# Patient Record
Sex: FEMALE | Race: OTHER | Employment: UNEMPLOYED | URBAN - METROPOLITAN AREA
[De-identification: names, ages, dates, MRNs, and addresses within clinical notes are randomized per-mention and may not be internally consistent; named-entity substitution may affect disease eponyms.]

---

## 2019-04-05 ENCOUNTER — OFFICE VISIT (OUTPATIENT)
Dept: FAMILY MEDICINE CLINIC | Facility: CLINIC | Age: 44
End: 2019-04-05
Payer: COMMERCIAL

## 2019-04-05 VITALS
TEMPERATURE: 97 F | HEIGHT: 60.25 IN | HEART RATE: 76 BPM | WEIGHT: 141.38 LBS | OXYGEN SATURATION: 98 % | DIASTOLIC BLOOD PRESSURE: 78 MMHG | SYSTOLIC BLOOD PRESSURE: 108 MMHG | BODY MASS INDEX: 27.4 KG/M2 | RESPIRATION RATE: 18 BRPM

## 2019-04-05 DIAGNOSIS — Z00.00 LABORATORY EXAM ORDERED AS PART OF ROUTINE GENERAL MEDICAL EXAMINATION: ICD-10-CM

## 2019-04-05 DIAGNOSIS — Z78.9 VEGETARIAN DIET: ICD-10-CM

## 2019-04-05 DIAGNOSIS — Z01.419 WELL WOMAN EXAM WITH ROUTINE GYNECOLOGICAL EXAM: Primary | ICD-10-CM

## 2019-04-05 DIAGNOSIS — Z12.31 VISIT FOR SCREENING MAMMOGRAM: ICD-10-CM

## 2019-04-05 DIAGNOSIS — I10 ESSENTIAL HYPERTENSION: ICD-10-CM

## 2019-04-05 DIAGNOSIS — E55.9 VITAMIN D DEFICIENCY: ICD-10-CM

## 2019-04-05 PROCEDURE — 87624 HPV HI-RISK TYP POOLED RSLT: CPT | Performed by: FAMILY MEDICINE

## 2019-04-05 PROCEDURE — 88175 CYTOPATH C/V AUTO FLUID REDO: CPT | Performed by: FAMILY MEDICINE

## 2019-04-05 PROCEDURE — 99386 PREV VISIT NEW AGE 40-64: CPT | Performed by: FAMILY MEDICINE

## 2019-04-05 NOTE — PROGRESS NOTES
Patient presents with: Well Adult      HPI:  44yr old female with HTN presents for WWE. Doing well overall. No concerns today. HTN, stable. Taking Benicar daily, no SEs noted. Menses regular, LMP 3/13. Last PAP in 2014 and nl. Last mammogram in 2017. Smoker      Smokeless tobacco: Never Used    Alcohol use: Yes      Alcohol/week: 0.0 oz      Comment: Occasional     Drug use: No        Current Outpatient Medications:  Ascorbic Acid (VITAMIN C OR) Take 500 mg by mouth daily.    Disp:  Rfl:          Sulfa Immunization History  Administered            Date(s) Administered    TDAP                  05/14/2012    Deferred                Date(s) Deferred    FLU VACC 4 ABIGAIL Split Virus 3 YR+ (97856)                          11/12/2015      Osteoporosis Screening this Visit:  Requested Prescriptions      No prescriptions requested or ordered in this encounter       Imaging & Consults:  TABATHA SCREENING BILAT (CPT=77067)      No follow-ups on file.

## 2019-04-05 NOTE — PATIENT INSTRUCTIONS
Prevention Guidelines, Women Ages 36 to 52  Screening tests and vaccines are an important part of managing your health. A screening test is done to find possible disorders or diseases in people who don't have any symptoms.  The goal is to find a disease e Depression All women in this age group At routine exams   Gonorrhea Sexually active women at increased risk for infection At routine exams   Hepatitis C Anyone at increased risk; 1 time for those born between [facility] At routine exams   High cholester Meningococcal Women at increased risk for infection–talk with your healthcare provider 1 or more doses   Pneumococcal conjugate vaccine (PCV13) and pneumococcal polysaccharide vaccine (PPSV23) Women at increased risk for infection–talk with your healthcare

## 2019-04-10 ENCOUNTER — TELEPHONE (OUTPATIENT)
Dept: FAMILY MEDICINE CLINIC | Facility: CLINIC | Age: 44
End: 2019-04-10

## 2019-04-10 NOTE — TELEPHONE ENCOUNTER
----- Message from [facility], DO sent at 4/10/2019  9:10 AM CDT -----  Pls inform pt that pap nl and hpv neg.  Repeat in 3yrs

## 2019-04-18 ENCOUNTER — TELEPHONE (OUTPATIENT)
Dept: FAMILY MEDICINE CLINIC | Facility: CLINIC | Age: 44
End: 2019-04-18

## 2019-04-18 NOTE — TELEPHONE ENCOUNTER
Patient states she spoke with someone and asked for her lab orders to be faxed to [address] [place] [address], but they did not get the fax number. I will print all orders and fax to [address] [place] [address] at [phone] as patient is waiting.

## 2019-05-07 ENCOUNTER — TELEPHONE (OUTPATIENT)
Dept: FAMILY MEDICINE CLINIC | Facility: CLINIC | Age: 44
End: 2019-05-07

## 2019-05-07 NOTE — TELEPHONE ENCOUNTER
Pt called asking for blood test results.  She also scheduled a blood test f/u appointment for 5/16/19

## 2019-05-07 NOTE — TELEPHONE ENCOUNTER
Called patient who states she had her labs done at [facility] approx 2 weeks ago. No copy of labs in chart. Called [facility] and requested they fax all lab results. They states they will send labs from 4/18/19 to fax [phone].     Dr. Wendy Salazar,  [address] [place] [address]

## 2019-05-09 ENCOUNTER — MED REC SCAN ONLY (OUTPATIENT)
Dept: FAMILY MEDICINE CLINIC | Facility: CLINIC | Age: 44
End: 2019-05-09

## 2019-05-09 NOTE — TELEPHONE ENCOUNTER
Pls inform pt that labs all wnl except LDL at high end of normal at 100. Cont healthy eating habits and regular exercise. All other labs wnl.

## 2019-05-16 ENCOUNTER — OFFICE VISIT (OUTPATIENT)
Dept: FAMILY MEDICINE CLINIC | Facility: CLINIC | Age: 44
End: 2019-05-16
Payer: COMMERCIAL

## 2019-05-16 VITALS
DIASTOLIC BLOOD PRESSURE: 60 MMHG | HEIGHT: 60.25 IN | OXYGEN SATURATION: 99 % | WEIGHT: 143.13 LBS | HEART RATE: 80 BPM | TEMPERATURE: 98 F | SYSTOLIC BLOOD PRESSURE: 110 MMHG | BODY MASS INDEX: 27.73 KG/M2 | RESPIRATION RATE: 16 BRPM

## 2019-05-16 DIAGNOSIS — I10 ESSENTIAL HYPERTENSION: Primary | ICD-10-CM

## 2019-05-16 DIAGNOSIS — N64.52 BILATERAL NIPPLE DISCHARGE: ICD-10-CM

## 2019-05-16 PROCEDURE — 99214 OFFICE O/P EST MOD 30 MIN: CPT | Performed by: FAMILY MEDICINE

## 2019-05-16 RX ORDER — OLMESARTAN MEDOXOMIL 5 MG/1
10 TABLET ORAL DAILY
COMMUNITY
End: 2019-05-20

## 2019-05-16 NOTE — PATIENT INSTRUCTIONS
What Are Benign Breast Conditions? Most breast conditions are not cancer (benign), causing no serious harm to you. But all women are at some risk for breast cancer. Your risk increases as you grow older. Common benign breast changes are covered here.  H Benign breast lumps come in all shapes, textures, and sizes. A lump of fibrous tissue (fibroadenoma) may be smooth, firm, and rubbery. This type of lump is usually painless and movable. Have any lump checked by your healthcare provider.   Date Last Reviewed

## 2019-05-19 NOTE — PROGRESS NOTES
HPI:    Patient ID: Shailesh Macias is a 44year old female. HPI   43yr old female presents for f/u on HTN, recent labs and c/o bilateral nipple discharge. HTN, stable. Compliant with medication. No SEs noted.    Had recent labs done and here to f/u o affect. Her behavior is normal.   Nursing note and vitals reviewed. ASSESSMENT/PLAN:   1. Essential hypertension  - bp stable  - cpm    2.  Bilateral nipple discharge  - discussed possible etiologies of nipple discharge, appears benign  - mammog

## 2019-05-20 ENCOUNTER — TELEPHONE (OUTPATIENT)
Dept: FAMILY MEDICINE CLINIC | Facility: CLINIC | Age: 44
End: 2019-05-20

## 2019-05-20 RX ORDER — OLMESARTAN MEDOXOMIL 5 MG/1
10 TABLET ORAL DAILY
Qty: 180 TABLET | Refills: 0 | Status: SHIPPED | OUTPATIENT
Start: 2019-05-20 | End: 2019-08-18

## 2019-05-20 NOTE — TELEPHONE ENCOUNTER
Spoke to patient she says she has been on Benicar 10 mg Qd for a long time. She got the medicine from a dr in [place]. She would like a refill. Patient says she thought she would have gotten a refill as she asked for it at her appointment.

## 2019-05-20 NOTE — TELEPHONE ENCOUNTER
Dr Alondra Mccoy, you have never given the pt Benicar    No labs done yet so Rx can't be filled by nurses    Pended new Rx, please advise?

## 2019-07-17 ENCOUNTER — TELEPHONE (OUTPATIENT)
Dept: FAMILY MEDICINE CLINIC | Facility: CLINIC | Age: 44
End: 2019-07-17

## 2019-07-17 NOTE — TELEPHONE ENCOUNTER
Patient called and states has creamy vaginal discharge that is causing itching and irritation. Denies pain, UTI symptoms. Was told she could not be seen if she was on her Menses. LMP ended 6/26/19 and usually has a 28 day cycle.   Feels like her period w

## 2019-07-18 NOTE — TELEPHONE ENCOUNTER
Called patient for condition update. Has not started menses yet. Offered earlier appt. States she cannot come in any earlier due to dentist appt for daughter. Will come to scheduled appt this afternoon.     Future Appointments   Date Time Provider Depar

## 2019-09-11 ENCOUNTER — OFFICE VISIT (OUTPATIENT)
Dept: FAMILY MEDICINE CLINIC | Facility: CLINIC | Age: 44
End: 2019-09-11
Payer: COMMERCIAL

## 2019-09-11 VITALS
DIASTOLIC BLOOD PRESSURE: 74 MMHG | WEIGHT: 143 LBS | RESPIRATION RATE: 18 BRPM | BODY MASS INDEX: 27.71 KG/M2 | HEART RATE: 90 BPM | OXYGEN SATURATION: 98 % | TEMPERATURE: 98 F | SYSTOLIC BLOOD PRESSURE: 110 MMHG | HEIGHT: 60.25 IN

## 2019-09-11 DIAGNOSIS — J30.2 SEASONAL ALLERGIC RHINITIS, UNSPECIFIED TRIGGER: Primary | ICD-10-CM

## 2019-09-11 DIAGNOSIS — I10 ESSENTIAL HYPERTENSION: ICD-10-CM

## 2019-09-11 DIAGNOSIS — J02.9 SORE THROAT: ICD-10-CM

## 2019-09-11 DIAGNOSIS — M21.42 PES PLANUS OF LEFT FOOT: ICD-10-CM

## 2019-09-11 PROCEDURE — 99214 OFFICE O/P EST MOD 30 MIN: CPT | Performed by: FAMILY MEDICINE

## 2019-09-11 RX ORDER — FLUTICASONE PROPIONATE 50 MCG
1 SPRAY, SUSPENSION (ML) NASAL DAILY
Qty: 1 INHALER | Refills: 2 | Status: SHIPPED | OUTPATIENT
Start: 2019-09-11 | End: 2019-12-24

## 2019-09-11 RX ORDER — OLMESARTAN MEDOXOMIL 5 MG/1
10 TABLET ORAL DAILY
Qty: 90 TABLET | Refills: 0 | Status: CANCELLED | OUTPATIENT
Start: 2019-09-11

## 2019-09-11 NOTE — PROGRESS NOTES
HPI:   Cynthia Harden is a 38year old female who presents for sore throat, L foot pain and HTN. C/o sore and itchy throat for the past 2wks. Has tried home remedies without any relief.  Daughter sick with URI symptoms and she started having congestion of L foot, no pain with palpation    ASSESSMENT AND PLAN:   Nery Heath is a 38year old female who presents with:  1. Seasonal allergic rhinitis, unspecified trigger  2.  Sore throat  - clinically consistent with allergic rhinitis  - advised taking a

## 2019-09-30 ENCOUNTER — IMMUNIZATION (OUTPATIENT)
Dept: FAMILY MEDICINE CLINIC | Facility: CLINIC | Age: 44
End: 2019-09-30
Payer: COMMERCIAL

## 2019-09-30 DIAGNOSIS — Z23 NEED FOR VACCINATION: ICD-10-CM

## 2019-09-30 PROCEDURE — 90686 IIV4 VACC NO PRSV 0.5 ML IM: CPT | Performed by: FAMILY MEDICINE

## 2019-09-30 PROCEDURE — 90471 IMMUNIZATION ADMIN: CPT | Performed by: FAMILY MEDICINE

## 2019-10-04 ENCOUNTER — OFFICE VISIT (OUTPATIENT)
Dept: FAMILY MEDICINE CLINIC | Facility: CLINIC | Age: 44
End: 2019-10-04
Payer: COMMERCIAL

## 2019-10-04 VITALS — SYSTOLIC BLOOD PRESSURE: 116 MMHG | DIASTOLIC BLOOD PRESSURE: 78 MMHG

## 2019-10-04 DIAGNOSIS — Z01.30 BLOOD PRESSURE CHECK: Primary | ICD-10-CM

## 2019-10-31 ENCOUNTER — TELEPHONE (OUTPATIENT)
Dept: FAMILY MEDICINE CLINIC | Facility: CLINIC | Age: 44
End: 2019-10-31

## 2019-10-31 NOTE — TELEPHONE ENCOUNTER
Spoke to patient who states she accidentally touched a hot lid when she was cooking 3-4 days ago. Has some redness on a couple fingers. Denies blistering, drainage, or open skin. Has been putting neosporin ointment on red areas. Denies any pain or fever.

## 2019-10-31 NOTE — TELEPHONE ENCOUNTER
Evaluation of burn or \"skin tags,\" would require an ov not a physical. Pt not due for physical given previous 6mo ago

## 2019-10-31 NOTE — TELEPHONE ENCOUNTER
Pt called asking to schedule another physical for the year. She had a physical 4/2019. She told me she got a new ins in September 2019 therefore wants another physical tomorrow. Informed her Dr's not in the office.   She stated she wants a physical so s

## 2019-11-08 ENCOUNTER — OFFICE VISIT (OUTPATIENT)
Dept: FAMILY MEDICINE CLINIC | Facility: CLINIC | Age: 44
End: 2019-11-08
Payer: COMMERCIAL

## 2019-11-08 VITALS — DIASTOLIC BLOOD PRESSURE: 82 MMHG | SYSTOLIC BLOOD PRESSURE: 124 MMHG

## 2019-11-08 DIAGNOSIS — Z01.30 BLOOD PRESSURE CHECK: Primary | ICD-10-CM

## 2019-11-19 ENCOUNTER — OFFICE VISIT (OUTPATIENT)
Dept: FAMILY MEDICINE CLINIC | Facility: CLINIC | Age: 44
End: 2019-11-19
Payer: COMMERCIAL

## 2019-11-19 VITALS — SYSTOLIC BLOOD PRESSURE: 120 MMHG | DIASTOLIC BLOOD PRESSURE: 70 MMHG

## 2019-11-19 DIAGNOSIS — Z01.30 BLOOD PRESSURE CHECK: Primary | ICD-10-CM

## 2019-12-20 ENCOUNTER — HOSPITAL ENCOUNTER (EMERGENCY)
Facility: HOSPITAL | Age: 44
Discharge: HOME | End: 2019-12-21
Attending: EMERGENCY MEDICINE
Payer: COMMERCIAL

## 2019-12-20 VITALS
HEART RATE: 77 BPM | RESPIRATION RATE: 18 BRPM | BODY MASS INDEX: 27.26 KG/M2 | HEIGHT: 60 IN | TEMPERATURE: 98 F | SYSTOLIC BLOOD PRESSURE: 119 MMHG | OXYGEN SATURATION: 100 % | WEIGHT: 138.88 LBS | DIASTOLIC BLOOD PRESSURE: 90 MMHG

## 2019-12-20 DIAGNOSIS — T78.40XA ALLERGIC REACTION, INITIAL ENCOUNTER: Primary | ICD-10-CM

## 2019-12-20 PROCEDURE — S0028 INJECTION, FAMOTIDINE, 20 MG: HCPCS

## 2019-12-20 PROCEDURE — 99283 EMERGENCY DEPT VISIT LOW MDM: CPT

## 2019-12-20 RX ORDER — FAMOTIDINE 10 MG/ML
INJECTION, SOLUTION INTRAVENOUS
Status: DISCONTINUED
Start: 2019-12-20 | End: 2019-12-20 | Stop reason: WASHOUT

## 2019-12-20 RX ORDER — METHYLPREDNISOLONE SODIUM SUCCINATE 125 MG/2ML
INJECTION, POWDER, LYOPHILIZED, FOR SOLUTION INTRAMUSCULAR; INTRAVENOUS
Status: DISCONTINUED
Start: 2019-12-20 | End: 2019-12-20 | Stop reason: WASHOUT

## 2019-12-20 RX ORDER — PREDNISONE 20 MG/1
60 TABLET ORAL ONCE
Status: COMPLETED | OUTPATIENT
Start: 2019-12-20 | End: 2019-12-20

## 2019-12-20 RX ORDER — FAMOTIDINE 20 MG/1
20 TABLET ORAL ONCE
Status: COMPLETED | OUTPATIENT
Start: 2019-12-20 | End: 2019-12-20

## 2019-12-20 RX ORDER — DIPHENHYDRAMINE HCL 25 MG
50 CAPSULE ORAL ONCE
Status: COMPLETED | OUTPATIENT
Start: 2019-12-20 | End: 2019-12-20

## 2019-12-20 RX ORDER — DIPHENHYDRAMINE HYDROCHLORIDE 50 MG/ML
INJECTION INTRAMUSCULAR; INTRAVENOUS
Status: DISCONTINUED
Start: 2019-12-20 | End: 2019-12-20 | Stop reason: WASHOUT

## 2019-12-20 RX ORDER — OLMESARTAN MEDOXOMIL 5 MG/1
5 TABLET ORAL DAILY
COMMUNITY
End: 2021-11-19

## 2019-12-20 RX ORDER — PREDNISONE 20 MG/1
40 TABLET ORAL DAILY
Qty: 10 TABLET | Refills: 0 | Status: SHIPPED | OUTPATIENT
Start: 2019-12-20 | End: 2019-12-25

## 2019-12-21 NOTE — ED INITIAL ASSESSMENT (HPI)
Pt presents to ED with allergic reaction. Pt reports she ate shrimp at 830pm, and 30 minutes later developed severe itching to bilateral hands and feet. Reports rash developed and has spread. Denies taking benadryl at home.  Denies MICAH

## 2019-12-21 NOTE — ED PROVIDER NOTES
Patient Seen in: [facility] Emergency Department      History   Patient presents with:   Allergic Rxn Allergies    Stated Complaint: allergic reaction    HPI  CHIEF COMPLAINT: Allergic reaction    HISTORY OF PRESENT ILLNESS: Patient is a 16-year-old Systems    Positive for stated complaint: allergic reaction  Other systems are as noted in HPI. Constitutional and vital signs reviewed. All other systems reviewed and negative except as noted above.     Physical Exam     ED Triage Vitals [12/20/19 22 determined, within reasonable clinical confidence and prior to discharge, that an emergency medical condition was not or was no longer present. There was no indication for further evaluation, treatment or admission on an emergency basis.   Comprehensive ve

## 2019-12-24 ENCOUNTER — OFFICE VISIT (OUTPATIENT)
Dept: FAMILY MEDICINE CLINIC | Facility: CLINIC | Age: 44
End: 2019-12-24
Payer: COMMERCIAL

## 2019-12-24 ENCOUNTER — APPOINTMENT (OUTPATIENT)
Dept: LAB | Age: 44
End: 2019-12-24
Attending: FAMILY MEDICINE
Payer: COMMERCIAL

## 2019-12-24 VITALS
SYSTOLIC BLOOD PRESSURE: 116 MMHG | DIASTOLIC BLOOD PRESSURE: 70 MMHG | WEIGHT: 143.5 LBS | HEIGHT: 60 IN | BODY MASS INDEX: 28.17 KG/M2 | TEMPERATURE: 98 F | HEART RATE: 88 BPM | OXYGEN SATURATION: 99 % | RESPIRATION RATE: 16 BRPM

## 2019-12-24 DIAGNOSIS — T78.40XA ALLERGIC REACTION, INITIAL ENCOUNTER: ICD-10-CM

## 2019-12-24 DIAGNOSIS — T78.40XA ALLERGIC REACTION, INITIAL ENCOUNTER: Primary | ICD-10-CM

## 2019-12-24 DIAGNOSIS — I10 ESSENTIAL HYPERTENSION: ICD-10-CM

## 2019-12-24 DIAGNOSIS — L50.9 URTICARIA: ICD-10-CM

## 2019-12-24 PROCEDURE — 86003 ALLG SPEC IGE CRUDE XTRC EA: CPT | Performed by: FAMILY MEDICINE

## 2019-12-24 PROCEDURE — 99214 OFFICE O/P EST MOD 30 MIN: CPT | Performed by: FAMILY MEDICINE

## 2019-12-24 RX ORDER — EPINEPHRINE 0.3 MG/.3ML
0.3 INJECTION SUBCUTANEOUS ONCE
Qty: 1 EACH | Refills: 0 | Status: SHIPPED | OUTPATIENT
Start: 2019-12-24 | End: 2019-12-24

## 2019-12-24 NOTE — PATIENT INSTRUCTIONS
First Aid: Allergic Reactions  Limited reaction  A limited (localized) reaction affects only the area of contact. Some reactions may not show up for days. Others can occur almost right away. Step 1.  Stop the source  · If the person has been stung, scrap shock or provide rescue breathing or CPR, if needed. When to seek medical help  An allergic reaction may become more serious over time. Seek medical help if any of the following is true:  · A rash or hives covers the face, genitals, or most of the body. Talk with your healthcare provider right away if you think a medicine gave you hives. Watch for anaphylaxis  If you have hives, watch for symptoms of a severe reaction that can affect your entire body.  This is called anaphylaxis. Symptoms can include swol

## 2019-12-24 NOTE — PROGRESS NOTES
HPI:    Patient ID: Kellie Delcid is a 38year old female. HPI  41yr old female presents for f/u after recent ER visit for an allergic reaction on 12/20. States she had eaten shrimp and had a smoothie prior to the start of the rash.  However, these fo dry.   Nursing note and vitals reviewed. ASSESSMENT/PLAN:   1. Allergic reaction, initial encounter  2.  Urticaria  - resolved, unsure of cause  - given SEs of prednisone and pt asymptomatic, instructed to discontinue  - advised to start zyrtec

## 2020-01-30 ENCOUNTER — OFFICE VISIT (OUTPATIENT)
Dept: FAMILY MEDICINE CLINIC | Facility: CLINIC | Age: 45
End: 2020-01-30
Payer: COMMERCIAL

## 2020-01-30 VITALS
WEIGHT: 142.13 LBS | TEMPERATURE: 98 F | SYSTOLIC BLOOD PRESSURE: 106 MMHG | BODY MASS INDEX: 27.18 KG/M2 | OXYGEN SATURATION: 100 % | HEIGHT: 60.63 IN | DIASTOLIC BLOOD PRESSURE: 66 MMHG | RESPIRATION RATE: 16 BRPM | HEART RATE: 88 BPM

## 2020-01-30 DIAGNOSIS — L91.8 SKIN TAG: ICD-10-CM

## 2020-01-30 DIAGNOSIS — K21.9 GASTROESOPHAGEAL REFLUX DISEASE, ESOPHAGITIS PRESENCE NOT SPECIFIED: ICD-10-CM

## 2020-01-30 DIAGNOSIS — Z91.013 SHELLFISH ALLERGY: ICD-10-CM

## 2020-01-30 DIAGNOSIS — Z12.31 VISIT FOR SCREENING MAMMOGRAM: ICD-10-CM

## 2020-01-30 DIAGNOSIS — I10 ESSENTIAL HYPERTENSION: ICD-10-CM

## 2020-01-30 DIAGNOSIS — G47.00 INSOMNIA, UNSPECIFIED TYPE: ICD-10-CM

## 2020-01-30 DIAGNOSIS — Z00.00 WELL WOMAN EXAM (NO GYNECOLOGICAL EXAM): Primary | ICD-10-CM

## 2020-01-30 PROCEDURE — 99396 PREV VISIT EST AGE 40-64: CPT | Performed by: FAMILY MEDICINE

## 2020-01-30 PROCEDURE — 99214 OFFICE O/P EST MOD 30 MIN: CPT | Performed by: FAMILY MEDICINE

## 2020-01-30 RX ORDER — ZOLPIDEM TARTRATE 5 MG/1
5 TABLET ORAL NIGHTLY PRN
Qty: 20 TABLET | Refills: 0 | Status: SHIPPED | OUTPATIENT
Start: 2020-01-30 | End: 2021-03-11 | Stop reason: ALTCHOICE

## 2020-01-30 NOTE — PROGRESS NOTES
Patient presents with:  Physical      HPI:  41yr old female presents for her annual physical, f/u on allergy testing, GERD, c/o insomnia and skin tag. Menses regular. Last pap 2018 and nl. No hx of abnl paps. Due for mammogram 5/2020.   GERD, has been hav Hypertension Father    • Heart Disease Father    • Diabetes Mother    • Hypertension Mother      Social History    Tobacco Use      Smoking status: Never Smoker      Smokeless tobacco: Never Used    Alcohol use:  Yes      Alcohol/week: 0.0 standard drinks n/a  5.  Immunizations:   Immunization History  Administered            Date(s) Administered    FLULAVAL 6 months & older 0.5 ml Prefilled syringe (56802)                          09/30/2019      FLUZONE 3 Yrs+ Quad Prsv Free 0.5 ml (90310) discontinuing PPI, will obtain h.pylori testing and possible refer to GI for EGD  - allergy testing, shrimp +, given epipen, discussed indications and use, avoid all shellfish  - insomnia, practice proper sleep hygiene, will provide rx for Elaine Martin, reviewed

## 2020-01-30 NOTE — PATIENT INSTRUCTIONS
Tips to Control Acid Reflux    To control acid reflux, you’ll need to make some basic diet and lifestyle changes. The simple steps outlined below may be all you’ll need to ease discomfort. Watch what you eat  · Avoid fatty foods and spicy foods.   · Eat prediabetes All women beginning at age 39 and women without symptoms at any age who are overweight or obese and have 1 or more additional risk factors for diabetes At least every 3 years1   Type 2 diabetes or prediabetes All women diagnosed with gestationa routine exams if you're at risk or have symptoms   Depression All women in this age group At routine exams   Gonorrhea Sexually active women at increased risk for infection At routine exams   Hepatitis C Anyone at increased risk; 1 time for those born bet increased risk for infection–talk with your healthcare provider 1 or more doses   Pneumococcal conjugate vaccine (PCV13) and pneumococcal polysaccharide vaccine (PPSV23) Women at increased risk for infection–talk with your healthcare provider 1 or 2 doses before taking any medicine. Healthy lifestyle  Your lifestyle affects your health and your sleep. Here are some healthy habits:  · Keep a regular sleep schedule. Go to bed and get up at the same time each day. · Exercise regularly.  It may help you reduce you breathe deeply. Start with your feet and work up your body to your neck and face. Date Last Reviewed: 8/1/2017  © 5951-3497 [employer]. [address] [facility], [address] [place] [address]. All rights reserved.  This information is not intended as -lovenox for dvt ppx  fall precautions

## 2020-01-31 ENCOUNTER — TELEPHONE (OUTPATIENT)
Dept: FAMILY MEDICINE CLINIC | Facility: CLINIC | Age: 45
End: 2020-01-31

## 2020-01-31 NOTE — TELEPHONE ENCOUNTER
Is going for lab work tomorrow prior to physical. Wants liver function test ordered as well.  Please advise pt

## 2020-02-01 ENCOUNTER — TELEPHONE (OUTPATIENT)
Dept: FAMILY MEDICINE CLINIC | Facility: CLINIC | Age: 45
End: 2020-02-01

## 2020-02-01 ENCOUNTER — LAB ENCOUNTER (OUTPATIENT)
Dept: LAB | Age: 45
End: 2020-02-01
Attending: FAMILY MEDICINE
Payer: COMMERCIAL

## 2020-02-01 DIAGNOSIS — Z00.00 LABORATORY EXAM ORDERED AS PART OF ROUTINE GENERAL MEDICAL EXAMINATION: ICD-10-CM

## 2020-02-01 DIAGNOSIS — Z78.9 VEGETARIAN DIET: ICD-10-CM

## 2020-02-01 DIAGNOSIS — E55.9 VITAMIN D DEFICIENCY: ICD-10-CM

## 2020-02-01 LAB
ALBUMIN SERPL-MCNC: 3.4 G/DL (ref 3.4–5)
ALBUMIN/GLOB SERPL: 1 {RATIO} (ref 1–2)
ALP LIVER SERPL-CCNC: 68 U/L (ref 37–98)
ALT SERPL-CCNC: 23 U/L (ref 13–56)
ANION GAP SERPL CALC-SCNC: 5 MMOL/L (ref 0–18)
AST SERPL-CCNC: 14 U/L (ref 15–37)
BASOPHILS # BLD AUTO: 0.05 X10(3) UL (ref 0–0.2)
BASOPHILS NFR BLD AUTO: 0.8 %
BILIRUB SERPL-MCNC: 0.4 MG/DL (ref 0.1–2)
BUN BLD-MCNC: 10 MG/DL (ref 7–18)
BUN/CREAT SERPL: 14.5 (ref 10–20)
CALCIUM BLD-MCNC: 8.8 MG/DL (ref 8.5–10.1)
CHLORIDE SERPL-SCNC: 108 MMOL/L (ref 98–112)
CHOLEST SMN-MCNC: 166 MG/DL (ref ?–200)
CO2 SERPL-SCNC: 26 MMOL/L (ref 21–32)
CREAT BLD-MCNC: 0.69 MG/DL (ref 0.55–1.02)
DEPRECATED RDW RBC AUTO: 42.3 FL (ref 35.1–46.3)
EOSINOPHIL # BLD AUTO: 0.11 X10(3) UL (ref 0–0.7)
EOSINOPHIL NFR BLD AUTO: 1.7 %
ERYTHROCYTE [DISTWIDTH] IN BLOOD BY AUTOMATED COUNT: 13.7 % (ref 11–15)
GLOBULIN PLAS-MCNC: 3.3 G/DL (ref 2.8–4.4)
GLUCOSE BLD-MCNC: 92 MG/DL (ref 70–99)
HCT VFR BLD AUTO: 36.3 % (ref 35–48)
HDLC SERPL-MCNC: 47 MG/DL (ref 40–59)
HGB BLD-MCNC: 11.7 G/DL (ref 12–16)
IMM GRANULOCYTES # BLD AUTO: 0.01 X10(3) UL (ref 0–1)
IMM GRANULOCYTES NFR BLD: 0.2 %
LDLC SERPL CALC-MCNC: 97 MG/DL (ref ?–100)
LYMPHOCYTES # BLD AUTO: 1.56 X10(3) UL (ref 1–4)
LYMPHOCYTES NFR BLD AUTO: 24.5 %
M PROTEIN MFR SERPL ELPH: 6.7 G/DL (ref 6.4–8.2)
MCH RBC QN AUTO: 27.7 PG (ref 26–34)
MCHC RBC AUTO-ENTMCNC: 32.2 G/DL (ref 31–37)
MCV RBC AUTO: 85.8 FL (ref 80–100)
MONOCYTES # BLD AUTO: 0.64 X10(3) UL (ref 0.1–1)
MONOCYTES NFR BLD AUTO: 10.1 %
NEUTROPHILS # BLD AUTO: 3.99 X10 (3) UL (ref 1.5–7.7)
NEUTROPHILS # BLD AUTO: 3.99 X10(3) UL (ref 1.5–7.7)
NEUTROPHILS NFR BLD AUTO: 62.7 %
NONHDLC SERPL-MCNC: 119 MG/DL (ref ?–130)
OSMOLALITY SERPL CALC.SUM OF ELEC: 287 MOSM/KG (ref 275–295)
PATIENT FASTING Y/N/NP: YES
PATIENT FASTING Y/N/NP: YES
PLATELET # BLD AUTO: 272 10(3)UL (ref 150–450)
POTASSIUM SERPL-SCNC: 4 MMOL/L (ref 3.5–5.1)
RBC # BLD AUTO: 4.23 X10(6)UL (ref 3.8–5.3)
SODIUM SERPL-SCNC: 139 MMOL/L (ref 136–145)
TRIGL SERPL-MCNC: 109 MG/DL (ref 30–149)
TSI SER-ACNC: 0.75 MIU/ML (ref 0.36–3.74)
VIT B12 SERPL-MCNC: 434 PG/ML (ref 193–986)
VIT D+METAB SERPL-MCNC: 32 NG/ML (ref 30–100)
VLDLC SERPL CALC-MCNC: 22 MG/DL (ref 0–30)
WBC # BLD AUTO: 6.4 X10(3) UL (ref 4–11)

## 2020-02-01 PROCEDURE — 82607 VITAMIN B-12: CPT | Performed by: FAMILY MEDICINE

## 2020-02-01 PROCEDURE — 82306 VITAMIN D 25 HYDROXY: CPT | Performed by: FAMILY MEDICINE

## 2020-02-01 PROCEDURE — 80050 GENERAL HEALTH PANEL: CPT | Performed by: FAMILY MEDICINE

## 2020-02-01 PROCEDURE — 80061 LIPID PANEL: CPT | Performed by: FAMILY MEDICINE

## 2020-02-01 NOTE — TELEPHONE ENCOUNTER
Pt walked in office from Lab said she just had lab work done and wants to know if we can add A1C and Liver function test to orders?  Informed pt  and nurse not in office will send msg to clinical staff

## 2020-02-01 NOTE — TELEPHONE ENCOUNTER
I reviewed patient chart, she requested liver enzymes and A1c to be added ( asked for orders). LFTs are part of CMP, and A1c is not necessarily indicated as fasting sugar in 2016 was 85. If FBS is abnormal we can add A1c.  Some insurers are not payi

## 2020-02-03 ENCOUNTER — TELEPHONE (OUTPATIENT)
Dept: FAMILY MEDICINE CLINIC | Facility: CLINIC | Age: 45
End: 2020-02-03

## 2020-02-03 NOTE — TELEPHONE ENCOUNTER
Patient insurance wont give 20 pills. She already picked up 15 days of Rx as what the insurance will allow.

## 2020-02-03 NOTE — TELEPHONE ENCOUNTER
Dr. Alondra Mccoy,  Please advise    Labs were done on 2/1/2020 including CMP. Do you want anything added?

## 2020-06-19 ENCOUNTER — TELEPHONE (OUTPATIENT)
Dept: FAMILY MEDICINE CLINIC | Facility: CLINIC | Age: 45
End: 2020-06-19

## 2020-06-19 NOTE — TELEPHONE ENCOUNTER
Pt called stating she has itching in her ear & swelling on the right ankle. Wants to be seen today. Wanted no part of a video visit. Did not want to wait until Monday. Wants a call back from the nurse right away.

## 2020-06-19 NOTE — TELEPHONE ENCOUNTER
Itching in ear - almost a week   She has no fever, cough/denies any other symptoms, just itching. Pt take Zyrtec.  She has hydrocortisone cream that she can apply to the area to see if it helps over the weekend    Pt has ankle swelling around the time of

## 2020-10-05 ENCOUNTER — IMMUNIZATION (OUTPATIENT)
Dept: FAMILY MEDICINE CLINIC | Facility: CLINIC | Age: 45
End: 2020-10-05
Payer: COMMERCIAL

## 2020-10-05 DIAGNOSIS — Z23 NEED FOR VACCINATION: ICD-10-CM

## 2020-10-05 PROCEDURE — 90686 IIV4 VACC NO PRSV 0.5 ML IM: CPT | Performed by: FAMILY MEDICINE

## 2020-10-05 PROCEDURE — 90471 IMMUNIZATION ADMIN: CPT | Performed by: FAMILY MEDICINE

## 2021-01-03 ENCOUNTER — HOSPITAL ENCOUNTER (OUTPATIENT)
Age: 46
Discharge: HOME | End: 2021-01-03
Payer: COMMERCIAL

## 2021-01-03 VITALS
HEART RATE: 99 BPM | SYSTOLIC BLOOD PRESSURE: 132 MMHG | DIASTOLIC BLOOD PRESSURE: 91 MMHG | OXYGEN SATURATION: 100 % | TEMPERATURE: 98 F | WEIGHT: 136.69 LBS | BODY MASS INDEX: 25.81 KG/M2 | RESPIRATION RATE: 18 BRPM | HEIGHT: 61 IN

## 2021-01-03 DIAGNOSIS — B97.89 ACUTE VIRAL TONSILLITIS: Primary | ICD-10-CM

## 2021-01-03 DIAGNOSIS — J03.80 ACUTE VIRAL TONSILLITIS: Primary | ICD-10-CM

## 2021-01-03 LAB — POCT RAPID STREP: NEGATIVE

## 2021-01-03 PROCEDURE — 99202 OFFICE O/P NEW SF 15 MIN: CPT | Performed by: PHYSICIAN ASSISTANT

## 2021-01-03 PROCEDURE — 87081 CULTURE SCREEN ONLY: CPT | Performed by: PHYSICIAN ASSISTANT

## 2021-01-03 PROCEDURE — 87880 STREP A ASSAY W/OPTIC: CPT | Performed by: PHYSICIAN ASSISTANT

## 2021-01-03 RX ORDER — ASCORBIC ACID 500 MG
TABLET ORAL
COMMUNITY

## 2021-01-03 RX ORDER — DIPHENHYDRAMINE HYDROCHLORIDE 25 MG/1
TABLET ORAL
COMMUNITY

## 2021-01-03 NOTE — ED INITIAL ASSESSMENT (HPI)
Pt c/o sore throat since last night and states it feels similar to when she has had strep in the past. Pt states she had a PCR COVID-19 test yesterday that was negative today. Pt states she had slight chills yesterday that resolved.  Pt states she also has

## 2021-01-03 NOTE — ED PROVIDER NOTES
Patient Seen in: [facility] [address]      History   Patient presents with:  Sore Throat    Stated Complaint: sore throat    HPI/Subjective:   HPI    CHIEF COMPLAINT: Sore throat x1 day    HISTORY OF PRESENT ILLNESS: Patient is a pleasant 45-y Current:BP (!) 132/91   Pulse 99   Temp 98.3 °F (36.8 °C) (Temporal)   Resp 18   Ht 154.9 cm (5' 1\")   Wt 62 kg   LMP 12/16/2020 (Exact Date)   SpO2 100%   BMI 25.83 kg/m²         Physical Exam    Vital signs and nursing notes reviewed    General Ap longer   I discpresent. There was no indication for further evaluation, treatment or admission on an emergency basis. Comprehensive verbal and written discharge and follow-up instructions were provided to help prevent relapse or worsening.    I discussed

## 2021-03-11 ENCOUNTER — OFFICE VISIT (OUTPATIENT)
Dept: FAMILY MEDICINE CLINIC | Facility: CLINIC | Age: 46
End: 2021-03-11
Payer: COMMERCIAL

## 2021-03-11 VITALS
SYSTOLIC BLOOD PRESSURE: 120 MMHG | WEIGHT: 145.38 LBS | HEIGHT: 61 IN | DIASTOLIC BLOOD PRESSURE: 80 MMHG | OXYGEN SATURATION: 98 % | HEART RATE: 84 BPM | TEMPERATURE: 98 F | BODY MASS INDEX: 27.45 KG/M2 | RESPIRATION RATE: 16 BRPM

## 2021-03-11 DIAGNOSIS — E55.9 VITAMIN D DEFICIENCY: ICD-10-CM

## 2021-03-11 DIAGNOSIS — Z00.00 WELL WOMAN EXAM (NO GYNECOLOGICAL EXAM): Primary | ICD-10-CM

## 2021-03-11 DIAGNOSIS — Z12.31 VISIT FOR SCREENING MAMMOGRAM: ICD-10-CM

## 2021-03-11 DIAGNOSIS — M25.471 SWELLING OF BOTH ANKLES: ICD-10-CM

## 2021-03-11 DIAGNOSIS — I10 ESSENTIAL HYPERTENSION: ICD-10-CM

## 2021-03-11 DIAGNOSIS — Z83.3 FAMILY HISTORY OF DIABETES MELLITUS: ICD-10-CM

## 2021-03-11 DIAGNOSIS — M25.472 SWELLING OF BOTH ANKLES: ICD-10-CM

## 2021-03-11 DIAGNOSIS — Z00.00 LABORATORY EXAM ORDERED AS PART OF ROUTINE GENERAL MEDICAL EXAMINATION: ICD-10-CM

## 2021-03-11 PROCEDURE — 3074F SYST BP LT 130 MM HG: CPT | Performed by: FAMILY MEDICINE

## 2021-03-11 PROCEDURE — 3008F BODY MASS INDEX DOCD: CPT | Performed by: FAMILY MEDICINE

## 2021-03-11 PROCEDURE — 3079F DIAST BP 80-89 MM HG: CPT | Performed by: FAMILY MEDICINE

## 2021-03-11 PROCEDURE — 99396 PREV VISIT EST AGE 40-64: CPT | Performed by: FAMILY MEDICINE

## 2021-03-11 NOTE — PATIENT INSTRUCTIONS
Breast Health: Breast Self-Awareness  What is breast self-awareness? Breast self-awareness is knowing how your breasts normally look and feel. Your breasts change as you go through different stages of your life.  So it’s important to learn what is normal right away. Remember that most breast lumps are benign. This means they are not cancer. Jay last reviewed this educational content on 5/1/2020  © 5217-2250 [employer]. All rights reserved.  This information is not intended as a substit Cervical cancer All women in this age group, except women who have had a complete hysterectomy Pap test every 3 years or Pap test plus human papilloma virus (HPV) test every 5 years   Colorectal cancer Women age 39 years and older at average risk Multiple Complete exam at age 36 and eye exams every 2 to 4 years. If you have a chronic disease, ask your healthcare provider how often you should have your eyes examined. 4   Vaccine Who needs it How often   Chickenpox (varicella) All women in this age group who h Sexually transmitted infection prevention Women at increased risk for infection–talk with your healthcare provider At routine exams   Use of tobacco and the health effects it can cause All women in this age group Every exam   1 [employer]

## 2021-03-11 NOTE — PROGRESS NOTES
Patient presents with:  Physical      HPI:  37yr old female with HTN and vit d deficiency presents for her annual physical. Doing well overall. HTN, stable. Taking meds from [facility]. Denies any symptoms associated with bp.   Vit d deficiency, taking vit d3 o Alcohol/week: 0.0 standard drinks      Comment: Occasional     Drug use: No      Current Outpatient Medications   Medication Sig Dispense Refill   • Biotin 5 MG Oral Cap Take by mouth. • Multiple Vitamin (MULTI-DAY) Oral Tab Take by mouth.      • Alton Dey 09/30/2019  10/05/2020      FLUZONE 6 months and older PFS 0.5 ml (05954)                          09/30/2019      Influenza             09/14/2016      TDAP                  05/14/2012    Deferred                Date(s) Deferred    FLU VAC QIV SPLIT 3 YR testing, sooner if needed    Orders Placed This Encounter      CBC With Diff      CMP      Lipid      Hemoglobin A1C      TSH W Reflex To Free T4      Vitamin D, 25-Hydroxy      Meds & Refills for this Visit:  Requested Prescriptions      No prescriptions

## 2021-03-19 ENCOUNTER — LAB ENCOUNTER (OUTPATIENT)
Dept: LAB | Age: 46
End: 2021-03-19
Attending: FAMILY MEDICINE
Payer: COMMERCIAL

## 2021-03-19 DIAGNOSIS — Z83.3 FAMILY HISTORY OF DIABETES MELLITUS: ICD-10-CM

## 2021-03-19 DIAGNOSIS — E55.9 VITAMIN D DEFICIENCY: ICD-10-CM

## 2021-03-19 DIAGNOSIS — Z00.00 LABORATORY EXAM ORDERED AS PART OF ROUTINE GENERAL MEDICAL EXAMINATION: ICD-10-CM

## 2021-03-19 LAB
ALBUMIN SERPL-MCNC: 3.6 G/DL (ref 3.4–5)
ALBUMIN/GLOB SERPL: 1.1 {RATIO} (ref 1–2)
ALP LIVER SERPL-CCNC: 67 U/L
ALT SERPL-CCNC: 25 U/L
ANION GAP SERPL CALC-SCNC: 7 MMOL/L (ref 0–18)
AST SERPL-CCNC: 16 U/L (ref 15–37)
BASOPHILS # BLD AUTO: 0.08 X10(3) UL (ref 0–0.2)
BASOPHILS NFR BLD AUTO: 1.3 %
BILIRUB SERPL-MCNC: 0.6 MG/DL (ref 0.1–2)
BUN BLD-MCNC: 11 MG/DL (ref 7–18)
BUN/CREAT SERPL: 14.5 (ref 10–20)
CALCIUM BLD-MCNC: 9.2 MG/DL (ref 8.5–10.1)
CHLORIDE SERPL-SCNC: 107 MMOL/L (ref 98–112)
CHOLEST SMN-MCNC: 170 MG/DL (ref ?–200)
CO2 SERPL-SCNC: 24 MMOL/L (ref 21–32)
CREAT BLD-MCNC: 0.76 MG/DL
DEPRECATED RDW RBC AUTO: 45.5 FL (ref 35.1–46.3)
EOSINOPHIL # BLD AUTO: 0.11 X10(3) UL (ref 0–0.7)
EOSINOPHIL NFR BLD AUTO: 1.7 %
ERYTHROCYTE [DISTWIDTH] IN BLOOD BY AUTOMATED COUNT: 14.5 % (ref 11–15)
EST. AVERAGE GLUCOSE BLD GHB EST-MCNC: 120 MG/DL (ref 68–126)
GLOBULIN PLAS-MCNC: 3.4 G/DL (ref 2.8–4.4)
GLUCOSE BLD-MCNC: 94 MG/DL (ref 70–99)
HBA1C MFR BLD HPLC: 5.8 % (ref ?–5.7)
HCT VFR BLD AUTO: 38.9 %
HDLC SERPL-MCNC: 49 MG/DL (ref 40–59)
HGB BLD-MCNC: 12.8 G/DL
IMM GRANULOCYTES # BLD AUTO: 0.02 X10(3) UL (ref 0–1)
IMM GRANULOCYTES NFR BLD: 0.3 %
LDLC SERPL CALC-MCNC: 92 MG/DL (ref ?–100)
LYMPHOCYTES # BLD AUTO: 2.53 X10(3) UL (ref 1–4)
LYMPHOCYTES NFR BLD AUTO: 39.7 %
M PROTEIN MFR SERPL ELPH: 7 G/DL (ref 6.4–8.2)
MCH RBC QN AUTO: 28.3 PG (ref 26–34)
MCHC RBC AUTO-ENTMCNC: 32.9 G/DL (ref 31–37)
MCV RBC AUTO: 86.1 FL
MONOCYTES # BLD AUTO: 0.73 X10(3) UL (ref 0.1–1)
MONOCYTES NFR BLD AUTO: 11.4 %
NEUTROPHILS # BLD AUTO: 2.91 X10 (3) UL (ref 1.5–7.7)
NEUTROPHILS # BLD AUTO: 2.91 X10(3) UL (ref 1.5–7.7)
NEUTROPHILS NFR BLD AUTO: 45.6 %
NONHDLC SERPL-MCNC: 121 MG/DL (ref ?–130)
OSMOLALITY SERPL CALC.SUM OF ELEC: 285 MOSM/KG (ref 275–295)
PATIENT FASTING Y/N/NP: YES
PATIENT FASTING Y/N/NP: YES
PLATELET # BLD AUTO: 303 10(3)UL (ref 150–450)
POTASSIUM SERPL-SCNC: 4.1 MMOL/L (ref 3.5–5.1)
RBC # BLD AUTO: 4.52 X10(6)UL
SODIUM SERPL-SCNC: 138 MMOL/L (ref 136–145)
TRIGL SERPL-MCNC: 147 MG/DL (ref 30–149)
TSI SER-ACNC: 0.55 MIU/ML (ref 0.36–3.74)
VIT D+METAB SERPL-MCNC: 23 NG/ML (ref 30–100)
VLDLC SERPL CALC-MCNC: 29 MG/DL (ref 0–30)
WBC # BLD AUTO: 6.4 X10(3) UL (ref 4–11)

## 2021-03-19 PROCEDURE — 80061 LIPID PANEL: CPT | Performed by: FAMILY MEDICINE

## 2021-03-19 PROCEDURE — 83036 HEMOGLOBIN GLYCOSYLATED A1C: CPT | Performed by: FAMILY MEDICINE

## 2021-03-19 PROCEDURE — 80050 GENERAL HEALTH PANEL: CPT | Performed by: FAMILY MEDICINE

## 2021-03-19 PROCEDURE — 36415 COLL VENOUS BLD VENIPUNCTURE: CPT | Performed by: FAMILY MEDICINE

## 2021-03-19 PROCEDURE — 82306 VITAMIN D 25 HYDROXY: CPT | Performed by: FAMILY MEDICINE

## 2021-03-22 ENCOUNTER — TELEPHONE (OUTPATIENT)
Dept: FAMILY MEDICINE CLINIC | Facility: CLINIC | Age: 46
End: 2021-03-22

## 2021-03-22 NOTE — TELEPHONE ENCOUNTER
Returned patients phone call, explained that Dr. Jeaneth Jimenez was out of office last week and to allow 3 days for Dr. Jeaneth Jimenez to review results. Pt verbalized understanding with no questions or concerns.

## 2021-03-30 ENCOUNTER — TELEPHONE (OUTPATIENT)
Dept: FAMILY MEDICINE CLINIC | Facility: CLINIC | Age: 46
End: 2021-03-30

## 2021-03-30 NOTE — TELEPHONE ENCOUNTER
Returned patients call indicated per Dr. Saul Lopez conveyed  1. A1c 5.8 with fbs 94, a1c in prediabetic range, cont to work on low carb diet and regular exercise  2. Vit d low at 23, take vit d3 2000u daily otc  All other labs wnl.     Pt verbalized understand

## 2021-05-11 ENCOUNTER — TELEPHONE (OUTPATIENT)
Dept: FAMILY MEDICINE CLINIC | Facility: CLINIC | Age: 46
End: 2021-05-11

## 2021-05-11 NOTE — TELEPHONE ENCOUNTER
Pt LVM wanting to speak with a nurse about a prescription. Tried to call pt back-- LVM to return call in the morning for more details.

## 2021-07-22 ENCOUNTER — TELEPHONE (OUTPATIENT)
Dept: FAMILY MEDICINE CLINIC | Facility: CLINIC | Age: 46
End: 2021-07-22

## 2021-07-22 NOTE — TELEPHONE ENCOUNTER
Does this need to be Triaged?        ----- Message -----   From: Paris Rodas   Sent: 7/21/2021   5:43 PM CDT   To: [facility] Front Office   Subject: Appointment scheduled from [place] [id number]       Appointment For: Paris Rodas ([id number])   Visit

## 2021-11-15 ENCOUNTER — TELEPHONE (OUTPATIENT)
Dept: FAMILY MEDICINE CLINIC | Facility: CLINIC | Age: 46
End: 2021-11-15

## 2021-11-15 NOTE — TELEPHONE ENCOUNTER
Pt called requesting an appointment. Sore throat & white coating in her tongue/mouth. Please advise.

## 2021-11-15 NOTE — TELEPHONE ENCOUNTER
LOV 03-11-21    Dry mouth and \"white\" tongue for 3 days, that has been \"whiter than usual\". Just started taking Zyrtec for allergies around the same time. Denies throat pain or fever, just a \"tightness\" in the back of the throat that feels \"dry\".

## 2021-11-16 NOTE — TELEPHONE ENCOUNTER
Called patient who states she started taking Zyrtec forher allergies and now her throat is better. States her tongue is still whiter than normal and she has a cold sore. Has been putting oragel on it.  Denies fever, chills, runny nose or any other URI sym

## 2021-11-16 NOTE — TELEPHONE ENCOUNTER
Would need to be evaluated in office by me or anyone else, as long as all COVID screening is negative

## 2021-11-19 ENCOUNTER — OFFICE VISIT (OUTPATIENT)
Dept: FAMILY MEDICINE CLINIC | Facility: CLINIC | Age: 46
End: 2021-11-19
Payer: COMMERCIAL

## 2021-11-19 VITALS
OXYGEN SATURATION: 99 % | SYSTOLIC BLOOD PRESSURE: 122 MMHG | RESPIRATION RATE: 16 BRPM | HEIGHT: 61.75 IN | DIASTOLIC BLOOD PRESSURE: 78 MMHG | HEART RATE: 74 BPM | BODY MASS INDEX: 26.09 KG/M2 | TEMPERATURE: 97 F | WEIGHT: 141.81 LBS

## 2021-11-19 DIAGNOSIS — E55.9 VITAMIN D DEFICIENCY: ICD-10-CM

## 2021-11-19 DIAGNOSIS — B37.0 ORAL THRUSH: ICD-10-CM

## 2021-11-19 DIAGNOSIS — R73.03 PREDIABETES: ICD-10-CM

## 2021-11-19 DIAGNOSIS — K12.0 APHTHOUS ULCER: Primary | ICD-10-CM

## 2021-11-19 DIAGNOSIS — B00.1 HERPES LABIALIS: ICD-10-CM

## 2021-11-19 DIAGNOSIS — I10 ESSENTIAL HYPERTENSION: ICD-10-CM

## 2021-11-19 PROCEDURE — 3074F SYST BP LT 130 MM HG: CPT | Performed by: FAMILY MEDICINE

## 2021-11-19 PROCEDURE — 3078F DIAST BP <80 MM HG: CPT | Performed by: FAMILY MEDICINE

## 2021-11-19 PROCEDURE — 3008F BODY MASS INDEX DOCD: CPT | Performed by: FAMILY MEDICINE

## 2021-11-19 PROCEDURE — 99214 OFFICE O/P EST MOD 30 MIN: CPT | Performed by: FAMILY MEDICINE

## 2021-11-19 RX ORDER — TRIAMCINOLONE ACETONIDE 0.1 %
PASTE (GRAM) DENTAL
Qty: 5 G | Refills: 0 | Status: SHIPPED | OUTPATIENT
Start: 2021-11-19

## 2021-11-19 RX ORDER — VALACYCLOVIR HYDROCHLORIDE 1 G/1
2000 TABLET, FILM COATED ORAL EVERY 12 HOURS SCHEDULED
Qty: 4 TABLET | Refills: 1 | Status: SHIPPED | OUTPATIENT
Start: 2021-11-19 | End: 2021-11-20

## 2021-11-19 RX ORDER — OLMESARTAN MEDOXOMIL 5 MG/1
5 TABLET ORAL DAILY
Qty: 90 TABLET | Refills: 1 | Status: SHIPPED | OUTPATIENT
Start: 2021-11-19

## 2021-11-19 NOTE — PROGRESS NOTES
Subjective:   Patient ID: Jimbo Horvath is a 40year old female. HPI   39yr old female presents for multiple complaints. States she has noticed her tongue has been white and it does seem to be coming off for the past 2-3d.  Denies any recent abx use 0.1 % Mouth/Throat Paste; 1 application to affected area twice daily x 1-2wks  Dispense: 5 g; Refill: 0    2. Oral thrush  - nystatin 551201 UNIT/ML Mouth/Throat Suspension; Take 5 mL (500,000 Units total) by mouth 4 (four) times daily for 7 days.  Swish ar

## 2021-11-19 NOTE — PATIENT INSTRUCTIONS
Candida Infection: Jessi January is a fungal infection in the mouth and throat. Ne Reins doesn't usually affect healthy adults. It's more common in people with a weak immune system. It's also more likely if you take antibiotics.  Ne Reins is normally not con healthcare provider will ask about your medical history and your symptoms. He or she will look closely at your mouth and throat. White or red patches will be found and may be scraped with a tongue depressor.  A sample may be looked at under a microscope or rinse out your mouth with water or mouthwash. · Don't use broad-spectrum antibiotics, if possible. · Get treated for health problems that increase your risk for thrush, such as diabetes or HIV.   When to call the healthcare provider  Call your healthcare other-the-counter topical treatments. These medicines are put right on the sores. Topical steroids may protect the canker sores from more irritation and allow them to heal. Topical pain relief medicines may numb the area and make the sores less painful.   ·

## 2021-11-23 ENCOUNTER — LAB ENCOUNTER (OUTPATIENT)
Dept: LAB | Age: 46
End: 2021-11-23
Attending: FAMILY MEDICINE
Payer: COMMERCIAL

## 2021-11-23 DIAGNOSIS — K12.0 APHTHOUS ULCER: ICD-10-CM

## 2021-11-23 DIAGNOSIS — E55.9 VITAMIN D DEFICIENCY: ICD-10-CM

## 2021-11-23 DIAGNOSIS — R73.03 PREDIABETES: ICD-10-CM

## 2021-11-23 PROCEDURE — 82607 VITAMIN B-12: CPT

## 2021-11-23 PROCEDURE — 36415 COLL VENOUS BLD VENIPUNCTURE: CPT

## 2021-11-23 PROCEDURE — 83036 HEMOGLOBIN GLYCOSYLATED A1C: CPT

## 2021-11-23 PROCEDURE — 82306 VITAMIN D 25 HYDROXY: CPT

## 2021-11-29 ENCOUNTER — TELEPHONE (OUTPATIENT)
Dept: FAMILY MEDICINE CLINIC | Facility: CLINIC | Age: 46
End: 2021-11-29

## 2021-11-30 NOTE — TELEPHONE ENCOUNTER
Pls inform pt that labs demonstrate:  1. A1c 5.8, still in prediabetic range  2. Vit b12 is elevated, will need to dc vit b supplements  3.  Vit d is wnl, cont vit d3 1000iu daily otc    Called patient and advised of lab results and recommendations per

## 2021-11-30 NOTE — TELEPHONE ENCOUNTER
Called pt to inform per PCP recommends to stop the biotin as well. No further questions or concerns. Pt verbalized understanding and agreed with POC.

## 2022-04-22 ENCOUNTER — TELEPHONE (OUTPATIENT)
Dept: FAMILY MEDICINE CLINIC | Facility: CLINIC | Age: 47
End: 2022-04-22

## 2022-04-22 NOTE — TELEPHONE ENCOUNTER
Pt would like to get mammogram done before her appt for physical on , order in epic , please enter order to THE [facility]

## 2022-04-22 NOTE — TELEPHONE ENCOUNTER
Last Mammogram 5/10/19 at [facility], result Benign    Updated screening mammogram order placed.

## 2022-04-28 ENCOUNTER — OFFICE VISIT (OUTPATIENT)
Dept: FAMILY MEDICINE CLINIC | Facility: CLINIC | Age: 47
End: 2022-04-28
Payer: COMMERCIAL

## 2022-04-28 VITALS
HEIGHT: 60.71 IN | HEART RATE: 72 BPM | SYSTOLIC BLOOD PRESSURE: 116 MMHG | TEMPERATURE: 97 F | OXYGEN SATURATION: 99 % | BODY MASS INDEX: 26.8 KG/M2 | RESPIRATION RATE: 16 BRPM | WEIGHT: 140.13 LBS | DIASTOLIC BLOOD PRESSURE: 72 MMHG

## 2022-04-28 DIAGNOSIS — Z12.31 VISIT FOR SCREENING MAMMOGRAM: ICD-10-CM

## 2022-04-28 DIAGNOSIS — I10 ESSENTIAL HYPERTENSION: ICD-10-CM

## 2022-04-28 DIAGNOSIS — Z12.11 SCREENING FOR COLON CANCER: ICD-10-CM

## 2022-04-28 DIAGNOSIS — Z91.018 MULTIPLE FOOD ALLERGIES: ICD-10-CM

## 2022-04-28 DIAGNOSIS — Z00.00 LABORATORY EXAM ORDERED AS PART OF ROUTINE GENERAL MEDICAL EXAMINATION: ICD-10-CM

## 2022-04-28 DIAGNOSIS — Z23 NEED FOR VACCINATION: ICD-10-CM

## 2022-04-28 DIAGNOSIS — Z01.419 WELL WOMAN EXAM WITH ROUTINE GYNECOLOGICAL EXAM: Primary | ICD-10-CM

## 2022-04-28 DIAGNOSIS — E55.9 VITAMIN D DEFICIENCY: ICD-10-CM

## 2022-04-28 DIAGNOSIS — R73.03 PREDIABETES: ICD-10-CM

## 2022-04-28 RX ORDER — EPINEPHRINE 0.3 MG/.3ML
0.3 INJECTION SUBCUTANEOUS ONCE
Qty: 1 EACH | Refills: 1 | Status: SHIPPED | OUTPATIENT
Start: 2022-04-28 | End: 2022-04-28

## 2022-04-28 RX ORDER — OLMESARTAN MEDOXOMIL 5 MG/1
5 TABLET ORAL DAILY
Qty: 90 TABLET | Refills: 1 | Status: SHIPPED | OUTPATIENT
Start: 2022-04-28

## 2022-04-29 ENCOUNTER — TELEPHONE (OUTPATIENT)
Dept: FAMILY MEDICINE CLINIC | Facility: CLINIC | Age: 47
End: 2022-04-29

## 2022-04-29 NOTE — TELEPHONE ENCOUNTER
Informed patient that is one of side affect is redness at injection side and swelling , low grade fever is expected . Recommended tylenol and ibuprofen , and ice pack at arm that she gotten vaccine .

## 2022-04-29 NOTE — TELEPHONE ENCOUNTER
Had tetanus booster vaccine yesterday   and las night had itching all over body, headache, injection site is sore. is there anything she should do?

## 2022-05-08 LAB
ABSOLUTE BASOPHILS: 78 CELLS/UL (ref 0–200)
ABSOLUTE EOSINOPHILS: 102 CELLS/UL (ref 15–500)
ABSOLUTE LYMPHOCYTES: 2136 CELLS/UL (ref 850–3900)
ABSOLUTE MONOCYTES: 606 CELLS/UL (ref 200–950)
ABSOLUTE NEUTROPHILS: 3078 CELLS/UL (ref 1500–7800)
ALBUMIN/GLOBULIN RATIO: 1.5 (CALC) (ref 1–2.5)
ALBUMIN: 4 G/DL (ref 3.6–5.1)
ALKALINE PHOSPHATASE: 63 U/L (ref 31–125)
ALT: 16 U/L (ref 6–29)
AST: 16 U/L (ref 10–35)
BASOPHILS: 1.3 %
BILIRUBIN, TOTAL: 0.7 MG/DL (ref 0.2–1.2)
BUN: 10 MG/DL (ref 7–25)
CALCIUM: 9.2 MG/DL (ref 8.6–10.2)
CARBON DIOXIDE: 24 MMOL/L (ref 20–32)
CHLORIDE: 105 MMOL/L (ref 98–110)
CHOL/HDLC RATIO: 3.7 (CALC)
CHOLESTEROL, TOTAL: 165 MG/DL
CREATININE: 0.71 MG/DL (ref 0.5–1.1)
EGFR IF AFRICN AM: 119 ML/MIN/1.73M2
EGFR IF NONAFRICN AM: 103 ML/MIN/1.73M2
EOSINOPHILS: 1.7 %
GLOBULIN: 2.7 G/DL (CALC) (ref 1.9–3.7)
GLUCOSE: 100 MG/DL (ref 65–99)
HDL CHOLESTEROL: 45 MG/DL
HEMATOCRIT: 36.7 % (ref 35–45)
HEMOGLOBIN A1C: 5.5 % OF TOTAL HGB
HEMOGLOBIN: 12.1 G/DL (ref 11.7–15.5)
LDL-CHOLESTEROL: 98 MG/DL (CALC)
LYMPHOCYTES: 35.6 %
MCH: 27.9 PG (ref 27–33)
MCHC: 33 G/DL (ref 32–36)
MCV: 84.6 FL (ref 80–100)
MONOCYTES: 10.1 %
MPV: 10 FL (ref 7.5–12.5)
NEUTROPHILS: 51.3 %
NON-HDL CHOLESTEROL: 120 MG/DL (CALC)
PLATELET COUNT: 333 THOUSAND/UL (ref 140–400)
POTASSIUM: 4.2 MMOL/L (ref 3.5–5.3)
PROTEIN, TOTAL: 6.7 G/DL (ref 6.1–8.1)
RDW: 13.4 % (ref 11–15)
RED BLOOD CELL COUNT: 4.34 MILLION/UL (ref 3.8–5.1)
SODIUM: 137 MMOL/L (ref 135–146)
TRIGLYCERIDES: 122 MG/DL
TSH W/REFLEX TO FT4: 1.84 MIU/L
WHITE BLOOD CELL COUNT: 6 THOUSAND/UL (ref 3.8–10.8)

## 2022-05-16 LAB — HPV I/H RISK 1 DNA SPEC QL NAA+PROBE: NEGATIVE

## 2022-05-19 ENCOUNTER — TELEPHONE (OUTPATIENT)
Dept: FAMILY MEDICINE CLINIC | Facility: CLINIC | Age: 47
End: 2022-05-19

## 2022-05-19 NOTE — TELEPHONE ENCOUNTER
Good evening Rosenda,  Hope you're well. Your labs demonstrate a slightly elevated fasting glucose but normal a1c levels. I would have you continue to work on healthy eating habits, low carb and regular exercise.    All other labs are normal.  Take care,   Dr. Fay Pruitt   Written by Carlie Carey DO on 5/8/2022 10:47 PM CDT  Seen by patient Paula Harden on 5/10/2022 10:58 AM

## 2022-05-19 NOTE — TELEPHONE ENCOUNTER
[phone]  Called pt to review lab results and diet. Pt indicated is traveling out of the country in June. Noted first booster 11/26/2021- advised 2nd booster 4 months from first booster. Recommend getting 2nd booster at least 2 weeks prior to travel for continued protection and decrease risk of infection/ severe illness with COVID-19. No further questions or concerns. Pt verbalized understanding and agreed with POC.

## 2022-06-02 ENCOUNTER — MED REC SCAN ONLY (OUTPATIENT)
Dept: FAMILY MEDICINE CLINIC | Facility: CLINIC | Age: 47
End: 2022-06-02

## 2022-06-09 ENCOUNTER — TELEPHONE (OUTPATIENT)
Dept: FAMILY MEDICINE CLINIC | Facility: CLINIC | Age: 47
End: 2022-06-09

## 2022-06-09 NOTE — TELEPHONE ENCOUNTER
[phone]    for pt ([id number] [address] per HIPAA) to inform mammogram (media 6/6/22) normal. Repeat mammo in 12 months. To call back at the office if any further questions.

## 2022-11-03 ENCOUNTER — TELEPHONE (OUTPATIENT)
Dept: FAMILY MEDICINE CLINIC | Facility: CLINIC | Age: 47
End: 2022-11-03

## 2022-11-03 NOTE — TELEPHONE ENCOUNTER
Medical Record Request Received    Date received in office:11/3/2022    Requested from: [facility]    Records to be sent to:  [address]  [address] [facility], [address]           Date request sent to [employer]:11/4/22

## (undated) DIAGNOSIS — Z12.31 SCREENING MAMMOGRAM FOR BREAST CANCER: Primary | ICD-10-CM

## (undated) NOTE — LETTER
04/12/21        [facility]       Dear Anjana Crowell,    Our records indicate that you have outstanding lab work and or testing that was ordered for you and has not yet been completed:  Orders Placed This Encou

## (undated) NOTE — ED AVS SNAPSHOT
Rian Ruiz   MRN: [id number]    Department:  [facility] Emergency Department   Date of Visit:  12/20/2019           Disclosure     Insurance plans vary and the physician(s) referred by the ER may not be covered by your plan.  Please contact y tell this physician (or your personal doctor if your instructions are to return to your personal doctor) about any new or lasting problems. The primary care or specialist physician will see patients referred from the [facility] Emergency Department.  Hailey Chatterjee

## (undated) NOTE — LETTER
January 30, 2020    [address]      Dear Krzysztof Munoz:    The following are the results of your recent tests. Please review the list of test results.   Your result is the value on the left; we have also suppl

## (undated) NOTE — LETTER
03/02/20        Cynthia Harden  [address] [facility]       Dear Anjana Crowell,    Our records indicate that you have outstanding lab work and or testing that was ordered for you and has not yet been completed:  Orders Placed This Encou